# Patient Record
Sex: FEMALE | Race: AMERICAN INDIAN OR ALASKA NATIVE
[De-identification: names, ages, dates, MRNs, and addresses within clinical notes are randomized per-mention and may not be internally consistent; named-entity substitution may affect disease eponyms.]

---

## 2020-11-09 ENCOUNTER — HOSPITAL ENCOUNTER (OUTPATIENT)
Dept: HOSPITAL 5 - XRAY | Age: 55
Discharge: HOME | End: 2020-11-09
Attending: INTERNAL MEDICINE
Payer: COMMERCIAL

## 2020-11-09 DIAGNOSIS — M47.817: ICD-10-CM

## 2020-11-09 DIAGNOSIS — M47.812: Primary | ICD-10-CM

## 2020-11-09 DIAGNOSIS — M25.859: ICD-10-CM

## 2020-11-09 PROCEDURE — 72040 X-RAY EXAM NECK SPINE 2-3 VW: CPT

## 2020-11-09 PROCEDURE — 72100 X-RAY EXAM L-S SPINE 2/3 VWS: CPT

## 2020-11-09 NOTE — XRAY REPORT
CERVICAL SPINE 3 VIEWS



INDICATION:  Neck pain.



COMPARISON: None.



IMPRESSION:  Normal alignment.  Minimal degenerative disc disease is identified at C5-6. The remainin
g levels are within normal limits.  No acute osseous or soft tissue abnormality.  







LUMBOSACRAL SPINE 3 VIEWS



INDICATION:  neck / BACK PAIN.



COMPARISON: None.



IMPRESSION:  Normal alignment.  Minimal degenerative disc disease is identified at L2-3, L3-4 and L4-
5. The remaining levels are within normal limits.  No acute osseous or soft tissue abnormality.  Ther
e are multiple peripherally calcified fibroids in the pelvis with the largest measuring 3.3 cm in yinka
meter.



Signer Name: Krzysztof Austin Jr, MD 

Signed: 11/9/2020 9:34 AM

Workstation Name: [id number]

## 2020-11-09 NOTE — XRAY REPORT
CERVICAL SPINE 3 VIEWS



INDICATION:  Neck pain.



COMPARISON: None.



IMPRESSION:  Normal alignment.  Minimal degenerative disc disease is identified at C5-6. The remainin
g levels are within normal limits.  No acute osseous or soft tissue abnormality.  







LUMBOSACRAL SPINE 3 VIEWS



INDICATION:  neck / BACK PAIN.



COMPARISON: None.



IMPRESSION:  Normal alignment.  Minimal degenerative disc disease is identified at L2-3, L3-4 and L4-
5. The remaining levels are within normal limits.  No acute osseous or soft tissue abnormality.  Ther
e are multiple peripherally calcified fibroids in the pelvis with the largest measuring 3.3 cm in yinka
meter.



Signer Name: Krzysztof Austin Jr, MD 

Signed: 11/9/2020 9:34 AM

Workstation Name: [id number]